# Patient Record
Sex: FEMALE | Race: WHITE | Employment: OTHER | URBAN - METROPOLITAN AREA
[De-identification: names, ages, dates, MRNs, and addresses within clinical notes are randomized per-mention and may not be internally consistent; named-entity substitution may affect disease eponyms.]

---

## 2020-01-07 ENCOUNTER — HOSPITAL ENCOUNTER (OUTPATIENT)
Facility: CLINIC | Age: 65
Discharge: HOME | End: 2020-01-07
Attending: COLON & RECTAL SURGERY | Admitting: COLON & RECTAL SURGERY
Payer: COMMERCIAL

## 2020-01-07 VITALS
HEIGHT: 64 IN | SYSTOLIC BLOOD PRESSURE: 107 MMHG | HEART RATE: 87 BPM | DIASTOLIC BLOOD PRESSURE: 70 MMHG | BODY MASS INDEX: 24.92 KG/M2 | OXYGEN SATURATION: 94 % | WEIGHT: 146 LBS | RESPIRATION RATE: 10 BRPM

## 2020-01-07 PROBLEM — F19.982 INSOMNIA DUE TO DRUG (H): Status: ACTIVE | Noted: 2017-04-26

## 2020-01-07 PROBLEM — K80.20 CALCULUS OF GALLBLADDER WITHOUT CHOLECYSTITIS WITHOUT OBSTRUCTION: Status: ACTIVE | Noted: 2017-04-26

## 2020-01-07 PROBLEM — Z85.038 HISTORY OF COLON CANCER: Status: ACTIVE | Noted: 2020-01-07

## 2020-01-07 LAB — COLONOSCOPY: NORMAL

## 2020-01-07 PROCEDURE — 45378 DIAGNOSTIC COLONOSCOPY: CPT | Performed by: COLON & RECTAL SURGERY

## 2020-01-07 PROCEDURE — G0500 MOD SEDAT ENDO SERVICE >5YRS: HCPCS | Performed by: COLON & RECTAL SURGERY

## 2020-01-07 PROCEDURE — G0105 COLORECTAL SCRN; HI RISK IND: HCPCS | Performed by: COLON & RECTAL SURGERY

## 2020-01-07 PROCEDURE — 25000128 H RX IP 250 OP 636: Performed by: COLON & RECTAL SURGERY

## 2020-01-07 RX ORDER — NALOXONE HYDROCHLORIDE 0.4 MG/ML
.1-.4 INJECTION, SOLUTION INTRAMUSCULAR; INTRAVENOUS; SUBCUTANEOUS
Status: CANCELLED | OUTPATIENT
Start: 2020-01-07 | End: 2020-01-08

## 2020-01-07 RX ORDER — FENTANYL CITRATE 50 UG/ML
INJECTION, SOLUTION INTRAMUSCULAR; INTRAVENOUS PRN
Status: DISCONTINUED | OUTPATIENT
Start: 2020-01-07 | End: 2020-01-07 | Stop reason: HOSPADM

## 2020-01-07 RX ORDER — HYDROCODONE BITARTRATE AND ACETAMINOPHEN 5; 325 MG/1; MG/1
1 TABLET ORAL EVERY 6 HOURS PRN
COMMUNITY

## 2020-01-07 RX ORDER — LIDOCAINE 40 MG/G
CREAM TOPICAL
Status: DISCONTINUED | OUTPATIENT
Start: 2020-01-07 | End: 2020-01-07 | Stop reason: HOSPADM

## 2020-01-07 RX ORDER — ONDANSETRON 2 MG/ML
4 INJECTION INTRAMUSCULAR; INTRAVENOUS EVERY 6 HOURS PRN
Status: CANCELLED | OUTPATIENT
Start: 2020-01-07

## 2020-01-07 RX ORDER — ONDANSETRON 2 MG/ML
4 INJECTION INTRAMUSCULAR; INTRAVENOUS
Status: DISCONTINUED | OUTPATIENT
Start: 2020-01-07 | End: 2020-01-07 | Stop reason: HOSPADM

## 2020-01-07 RX ORDER — ONDANSETRON 4 MG/1
4 TABLET, ORALLY DISINTEGRATING ORAL EVERY 6 HOURS PRN
Status: CANCELLED | OUTPATIENT
Start: 2020-01-07

## 2020-01-07 RX ORDER — FLUMAZENIL 0.1 MG/ML
0.2 INJECTION, SOLUTION INTRAVENOUS
Status: CANCELLED | OUTPATIENT
Start: 2020-01-07 | End: 2020-01-08

## 2020-01-07 RX ORDER — METHADONE HYDROCHLORIDE 5 MG/1
5 TABLET ORAL EVERY 8 HOURS
COMMUNITY

## 2020-01-07 ASSESSMENT — MIFFLIN-ST. JEOR: SCORE: 1202.25

## 2020-01-07 NOTE — BRIEF OP NOTE
[facility]    Brief Operative Note    Pre-operative diagnosis: Colon cancer screening [Z12.11]  Post-operative diagnosis normal colon    Procedure: Procedure(s):  COLONOSCOPY  Surgeon: Surgeon(s) and Role:     * Geeta Upton MD - Primary  Anesthesia: Conscious Sedation   Estimated blood loss: None  Drains: None  Specimens: * No specimens in log *  Findings:   See Provation procedure note in Epic    Complications: None.  Implants: * No implants in log *

## 2020-01-07 NOTE — H&P
Pre-Endoscopy History and Physical     Sommer Wilkins MRN# [id number]   YOB: 1955 Age: 64 year old     Date of Procedure: 1/7/2020  Primary care provider: Geeta Upton  Type of Endoscopy: colonoscopy  Reason for Procedure: screening, history of colon cancer  Type of Anesthesia Anticipated: moderate sedation    HPI:    Sommer is a 64 year old female who will be undergoing the above procedure.  Patient reports that her bowel habits alternate between constipation and diarrhea. She rarely notes some BRB on the toilet paper.     A history and physical has been performed. The patient's medications and allergies have been reviewed. The risks and benefits of the procedure and the sedation options and risks were discussed with the patient.  All questions were answered and informed consent was obtained.      She denies a personal or family history of anesthesia complications or bleeding disorders.   Prior to Admission medications    Medication Sig Start Date End Date Taking? Authorizing Provider   HYDROcodone-acetaminophen (NORCO) 5-325 MG tablet Take 1 tablet by mouth every 6 hours as needed for severe pain   Yes Reported, Patient   methadone (DOLOPHINE) 5 MG tablet Take 5 mg by mouth every 8 hours   Yes Reported, Patient   Bevacizumab (AVASTIN IV)     Reported, Patient   calcium carbonate (OS-SHELLIE 500 MG [place]) 500 MG tablet Take 500 mg by mouth 2 times daily    Reported, Patient   multivitamin, therapeutic with minerals (MULTI-VITAMIN) TABS Take 1 tablet by mouth daily    Reported, Patient   VITAMIN D, CHOLECALCIFEROL, PO Take by mouth daily    Reported, Patient       Allergies   Allergen Reactions     Paclitaxel Shortness Of Breath     stopped breathing     Chlorhexidine Rash     Tolerates Betadine        Current Facility-Administered Medications   Medication     lidocaine (LMX4) cream     lidocaine 1 % 0.1-1 mL     ondansetron (ZOFRAN) injection 4 mg     sodium chloride (PF) 0.9% PF flush 3 mL      "sodium chloride (PF) 0.9% PF flush 3 mL       Patient Active Problem List   Diagnosis     Calculus of gallbladder without cholecystitis without obstruction     History of colon cancer     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)     Lung cancer (H)        Past Medical History:   Diagnosis Date     Cancer (H) 1998    colon CA     Lung cancer (H) 2012    Right Lung        Past Surgical History:   Procedure Laterality Date     CHOLECYSTECTOMY  2019     COLONOSCOPY       COLONOSCOPY  3/18/2014    Procedure: COLONOSCOPY;  COLONOSCOPY;  Surgeon: Geeta Upton MD;  Location:  GI     GI SURGERY  4/98, 6/98    colon resections for CA       Social History     Tobacco Use     Smoking status: Never Smoker     Smokeless tobacco: Never Used   Substance Use Topics     Alcohol use: Yes     Comment: Wine, twice weekly       Family History   Problem Relation Age of Onset     Colon Cancer Father      Ovarian Cancer Sister        REVIEW OF SYSTEMS:     5 point ROS negative except as noted above in HPI, including Gen., Resp., CV, GI &  system review. She has pain in ribs and other bony areas. She experiences fatigue.       PHYSICAL EXAM:   /78   Pulse 101   Resp 18   Ht 1.626 m (5' 4\")   Wt 66.2 kg (146 lb)   SpO2 98%   BMI 25.06 kg/m   Estimated body mass index is 25.06 kg/m  as calculated from the following:    Height as of this encounter: 1.626 m (5' 4\").    Weight as of this encounter: 66.2 kg (146 lb).   GENERAL APPEARANCE: healthy  MENTAL STATUS: alert  AIRWAY EXAM: Mallampatti Class II (visualization of the soft palate, fauces, and uvula)  RESP: lungs clear to auscultation - no rales, rhonchi or wheezes  CV: regular rates and rhythm      DIAGNOSTICS:    Not indicated      IMPRESSION   ASA Class 3 - Severe systemic disease, but not incapacitating        PLAN:       Colonoscopy with possible polypectomy, possible biopsy. The indications, procedure and risks were explained to the patient who agrees to proceed.       The " above has been forwarded to the consulting provider.      Signed Electronically by: Geeta Upton MD  January 7, 2020

## (undated) RX ORDER — FENTANYL CITRATE 50 UG/ML
INJECTION, SOLUTION INTRAMUSCULAR; INTRAVENOUS
Status: DISPENSED
Start: 2020-01-07